# Patient Record
Sex: FEMALE | Race: WHITE | NOT HISPANIC OR LATINO | URBAN - METROPOLITAN AREA
[De-identification: names, ages, dates, MRNs, and addresses within clinical notes are randomized per-mention and may not be internally consistent; named-entity substitution may affect disease eponyms.]

---

## 2018-10-26 ENCOUNTER — OFFICE VISIT - RIVER FALLS (OUTPATIENT)
Dept: FAMILY MEDICINE | Facility: CLINIC | Age: 48
End: 2018-10-26

## 2018-10-26 ASSESSMENT — MIFFLIN-ST. JEOR: SCORE: 1416.26

## 2018-10-27 LAB
CHOLEST SERPL-MCNC: 213 MG/DL
CHOLEST/HDLC SERPL: 2.9 {RATIO}
HBA1C MFR BLD: 5.2 %
HDLC SERPL-MCNC: 73 MG/DL
LDLC SERPL CALC-MCNC: 121 MG/DL
NONHDLC SERPL-MCNC: 140 MG/DL
TRIGL SERPL-MCNC: 91 MG/DL

## 2018-10-31 ENCOUNTER — TRANSFERRED RECORDS (OUTPATIENT)
Dept: HEALTH INFORMATION MANAGEMENT | Facility: CLINIC | Age: 48
End: 2018-10-31

## 2018-10-31 LAB — HPV ABSTRACT: ABNORMAL

## 2018-11-02 ENCOUNTER — OFFICE VISIT - RIVER FALLS (OUTPATIENT)
Dept: FAMILY MEDICINE | Facility: CLINIC | Age: 48
End: 2018-11-02

## 2018-11-02 ASSESSMENT — MIFFLIN-ST. JEOR: SCORE: 1421.7

## 2018-11-14 ENCOUNTER — OFFICE VISIT - RIVER FALLS (OUTPATIENT)
Dept: FAMILY MEDICINE | Facility: CLINIC | Age: 48
End: 2018-11-14

## 2022-02-11 VITALS
BODY MASS INDEX: 34.52 KG/M2 | SYSTOLIC BLOOD PRESSURE: 130 MMHG | HEART RATE: 97 BPM | OXYGEN SATURATION: 97 % | DIASTOLIC BLOOD PRESSURE: 70 MMHG | WEIGHT: 188.8 LBS | HEIGHT: 62 IN | SYSTOLIC BLOOD PRESSURE: 120 MMHG | DIASTOLIC BLOOD PRESSURE: 68 MMHG | TEMPERATURE: 98.2 F | DIASTOLIC BLOOD PRESSURE: 88 MMHG | WEIGHT: 187.6 LBS | HEIGHT: 62 IN | BODY MASS INDEX: 34.74 KG/M2 | SYSTOLIC BLOOD PRESSURE: 140 MMHG | TEMPERATURE: 97.9 F | HEART RATE: 106 BPM | HEART RATE: 84 BPM

## 2022-02-16 NOTE — PROGRESS NOTES
Patient:   TRESSA KOENIG            MRN: [id number]            FIN: [id number]               Age:   48 years     Sex:  Female     :  1970   Associated Diagnoses:   ASCUS with positive high risk HPV cervical   Author:   Elizabeth Sher      Visit Information      Referring Provider:  Elizabeth Sher    NPI# [id number]      Chief Complaint      History of Present Illness   confirmed chief complaint with patient   She had ASCUS pap with HR HPV and is here to discuss  She last had a pap maybe 5 or 7 years ago and always normal, same partner many years  each had previous partner many years ago      Review of Systems             Health Status   Allergies:    Allergic Reactions (Selected)  No Known Medication Allergies   Medications:  (Selected)      Problem list:    All Problems  Obesity / SNOMED CT 3608743419 / Probable  ASCUS with positive high risk HPV cervical / SNOMED CT 5456081364 / Confirmed  notified, needs colpo      Histories   Past Medical History:    No active or resolved past medical history items have been selected or recorded.   Family History:    No family history items have been selected or recorded.   Procedure history:    No active procedure history items have been selected or recorded.   Social History:             No active social history items have been recorded.      Physical Examination   Vital Signs   2018 2:37 PM CDT Temperature Tympanic 98.2 DegF    Peripheral Pulse Rate 97 bpm    HR Method Electronic    Systolic Blood Pressure 120 mmHg    Diastolic Blood Pressure 68 mmHg    Mean Arterial Pressure 85 mmHg    BP Site Right arm    BP Method Manual      Measurements from flowsheet : Measurements   2018 2:37 PM CDT Height Measured - Standard 61.5 in    Weight Measured - Standard 188.8 lb    BSA 1.93 m2    Body Mass Index 35.09 kg/m2  HI      General:  Alert and oriented, No acute distress.       Review / Management   Results review:  ASCUS papwith HR HPV positive.        Impression and Plan   Diagnosis     ASCUS with positive high risk HPV cervical (GLA94-OE R87.610).     Patient Instructions:       Counseled: Patient, Regarding diagnosis, Regarding treatment, Regarding medications, Verbalized understanding, 10 min in counseling, needs colpo, explained how HPV can lay dormant many years, this does NOT mean anyone has had a new partner.  SHe will set up colpo.

## 2022-02-16 NOTE — PROCEDURES
Accession Number:       [id number]  CLINICAL INFORMATION::     7 or more years since last Pap Normal exam  LMP::     JULY 2018  PREV. PAP::     NONE GIVEN  PREV. BX::     NONE GIVEN  SOURCE::     Vagina, Cervix  STATEMENT OF ADEQUACY::     Satisfactory for evaluation. Endocervical/transformation zone component present.  GENERAL CATEGORIZATION::     EPITHELIAL CELL ABNORMALITY  INTERPRETATION/RESULT::     Atypical Squamous Cells of Undetermined Significance (ASC-US)  COMMENT::     See comment       This Pap test has been evaluated with computer       assisted technology.       Suggest clinical correlation and follow-up as       clinically appropriate  CYTOTECHNOLOGIST::     ABDI SALGADO(ASCP) CT Screening location: [address] [address]  PATHOLOGIST::     See comment       Maurice Xiao M.D., Board Certified in Anatomic       Pathology, Clinical Pathology, Specializing in       Gynecological Pathology       3   (electronic signature)  COMMENT:     See comment       EXPLANATORY NOTE:         The Pap is a screening test for cervical cancer. It is       not a diagnostic test and is subject to false negative       and false positive results. It is most reliable when a       satisfactory sample, regularly obtained, is submitted       with relevant clinical findings and history, and when       the Pap result is evaluated along with historic and       current clinical information.  HPV mRNA E6/E7:     Detected       This test was performed using the APTIMA HPV Assay ([employer]       This assay detects E6/E7 viral messenger RNA (mRNA) from 14       high-risk HPV types (16,18,31,33,35,39,45,51,52,56,58,59,66,68).         The analytical performance characteristics of       this assay have been determined by [employer]. The modifications have not been       cleared or approved by the FDA. This assay has       been validated pursuant to the CLIA  regulations       and is used for clinical purposes.

## 2022-02-16 NOTE — PROGRESS NOTES
Patient:   TRESSA KOENIG            MRN: [id number]            FIN: [id number]               Age:   48 years     Sex:  Female     :  1970   Associated Diagnoses:   Cervicitis   Author:   Mj Barber MD      Physical Examination   Vital Signs   2018 8:16 AM CST Peripheral Pulse Rate 84 bpm    Pulse Site Radial artery    HR Method Manual    Systolic Blood Pressure 130 mmHg    Diastolic Blood Pressure 88 mmHg  HI    Mean Arterial Pressure 102 mmHg    BP Site Right arm    BP Method Manual         Procedure   The patient is in today for colposcopic evaluation of ASCUS Pap smear.  She has no other concerns with our visit.  She is perimenopausal and having some hot flashes but she is not having any intolerable symptoms at this time.     Colposcopy procedure   Date:  2018.     Confirmed: patient.     Performed by: Dr. Barber.     Informed consent: signed by patient.     Indication: abnormal pap smear.     Preparation and technique: placed in lithotomy position, manual pelvic exam performed, vaginal speculum inserted, filter used.     Findings: Colposcopy is unsatisfactory.  The ectocervix has some visible transformation zone which is minimally acetowhite but not thick or covered by any punctation or mosaic.  There is no suspicious epithelium seen..     Procedure tolerated: well.        Impression and Plan   Diagnosis     Cervicitis (OSN06-[id number] N72).     Atypical Pap with no obvious TEJA on examination..     Plan:  The patient will have Pap smear in one year for followup..    Counseled:  Patient, Regarding diagnosis, Regarding treatment.

## 2022-02-16 NOTE — PROGRESS NOTES
Patient:   TRESSA KOENIG            MRN: [id number]            FIN: [id number]               Age:   48 years     Sex:  Female     :  1970   Associated Diagnoses:   Well adult; Diabetes mellitus screening; Immunization due; Irregular periods; Obesity   Author:   Cristian PRESTON, Elizabeth      Chief Complaint   10/26/2018 1:49 PM CDT   Physical/Pap. Irregular cycle, menopausal. Lump on left breast.        Well Adult History   Well Adult History             The patient presents for well adult exam, she has not been to clinic in many years  here for annual exam  raising her kids, , no menses since July, feeling some hot flashes, partner with vasectomy  Had mammo years ago for some thickened tissue in the breast, still feels thick  would like to update vaccines, due for pap and gaining wt, agreeable to labwork.  The general health status is good.  The patient's diet is described as not balanced.  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  Associated symptoms consist of weight gain.  Last menstrual period: irregular.  Medical encounters:.  Additional pertinent history: tobacco use none.        Review of Systems   Constitutional:  Negative except as documented in history of present illness.    Eye:  Negative except as documented in history of present illness.    Respiratory:  Negative except as documented in history of present illness.    Cardiovascular:  Negative except as documented in history of present illness.    Breast:  Negative.    Gastrointestinal:  Negative except as documented in history of present illness.    Genitourinary:  Negative except as documented in history of present illness.    Gynecologic:  Negative except as documented in history of present illness.    Hematology/Lymphatics:  Negative except as documented in history of present illness.    Endocrine:  Negative except as documented in history of present illness.    Immunologic:  Negative except as  documented in history of present illness.    Musculoskeletal:  Negative except as documented in history of present illness.    Integumentary:  Negative except as documented in history of present illness.    Neurologic:  Negative except as documented in history of present illness.    Psychiatric:  Negative except as documented in history of present illness.              Health Status   Allergies:    Allergic Reactions (Selected)  No Known Medication Allergies   Medications:  (Selected)      Problem list:    All Problems  Obesity / SNOMED CT 8412999746 / Probable      Histories   Past Medical History:    No active or resolved past medical history items have been selected or recorded.   Family History:    No family history items have been selected or recorded.   Procedure history:    No active procedure history items have been selected or recorded.      Physical Examination   Vital Signs   10/26/2018 1:49 PM CDT Temperature Tympanic 97.9 DegF    Peripheral Pulse Rate 106 bpm  HI    HR Method Electronic    Systolic Blood Pressure 140 mmHg  HI    Diastolic Blood Pressure 70 mmHg    Mean Arterial Pressure 93 mmHg    BP Site Right arm    BP Method Manual    Oxygen Saturation 97 %      Measurements from flowsheet : Measurements   10/26/2018 1:49 PM CDT Height Measured - Standard 61.5 in    Weight Measured - Standard 187.6 lb    BSA 1.92 m2    Body Mass Index 34.87 kg/m2  HI      General:  Alert and oriented, No acute distress, vital signs stable, as noted above.    Eye:  Pupils are equal, round and reactive to light, Extraocular movements are intact, Normal conjunctiva.    HENT:  Normocephalic, Tympanic membranes are clear, Normal hearing, Oral mucosa is moist, No pharyngeal erythema.    Neck:  Supple, Non-tender, No lymphadenopathy, No thyromegaly.    Respiratory:  Lungs are clear to auscultation, Respirations are non-labored, Breath sounds are equal, Symmetrical chest wall expansion.    Cardiovascular:  Normal rate,  Regular rhythm, No murmur, No edema.    Breast:  No mass, No tenderness, No discharge, Breasts examined .  No infra nor supraclavicular nodes palpable.  No axillary nodes or masses palpable.  No nipple discharge. Breasts normal throughout.    Gastrointestinal:  Soft, Non-tender, Non-distended, No organomegaly.    Genitourinary:  Normal genitalia for age and sex, No inguinal tenderness, No urethral discharge, No lesions.         Perineum: Within normal limits.         Groin/ inguinal region: Within normal limits.         Urethra: Within normal limits.         Vagina: Within normal limits.         Labia: Within normal limits.         Cervix: Within normal limits.         Uterus: Within normal limits.         Adnexa: Within normal limits.    Lymphatics:  no axillary, no supra or infraclavicular nor inguinal lymphadenopathy palpable.    Musculoskeletal:  Normal range of motion, Normal strength, No deformity, Normal gait.    Integumentary:  Warm, Dry, Pink, Intact, No rash.    Neurologic:  Alert, Oriented, Normal sensory, Normal motor function, Cranial Nerves II-XII are grossly intact.    Psychiatric:  Cooperative, Appropriate mood & affect, Normal judgment, PHQ 9/CAGE questionaire reviewed and discussed with patient,  see score.       Review / Management   Results review:  Lab results: 10/26/2018 2:58 PM CDT   U HCG POC                 NEGATIVE  .       Impression and Plan   Diagnosis     Well adult (JIE44-OI Z00.00).     Diabetes mellitus screening (YRX54-HM Z13.1).     Immunization due (JGE56-TX Z23).     Irregular periods (SYB70-FU N92.6).     Obesity (TXU09-IU E66.9).     Patient Instructions:       Counseled: Patient, Regarding diagnosis, Regarding medications, Verbalized understanding, counseled on health benefits of healthy weight, regular exercise, healthy diet.    Orders     Orders (Selected)   Outpatient Orders  Ordered (In Transit)  CBC (h/h, RBC, indices, WBC, Plt)* ([facility]): Specimen Type: Blood, Collection  Date: 10/26/18 14:40:00 CDT  Hemoglobin A1c* ([facility]): Specimen Type: Blood, Collection Date: 10/26/18 14:40:00 CDT  Lipid panel with reflex to direct ldl* ([facility]): Specimen Type: Serum, Collection Date: 10/26/18 14:40:00 CDT  TSH* ([facility]): Specimen Type: Serum, Collection Date: 10/26/18 14:40:00 CDT  ThinPrep Imaging Pap and HPV mRNA E6/E7* ([facility]): Specimen Type: Pap, Collection Date: 10/26/18 14:40:00 CDT  Completed  Adacel (Tdap): 0.5 mL, im, once  Fluzone Quadrivalent [id number]: 0.5 mL, IM, once.